# Patient Record
Sex: MALE | Race: OTHER | Employment: FULL TIME | URBAN - METROPOLITAN AREA
[De-identification: names, ages, dates, MRNs, and addresses within clinical notes are randomized per-mention and may not be internally consistent; named-entity substitution may affect disease eponyms.]

---

## 2024-10-14 ENCOUNTER — HOSPITAL ENCOUNTER (EMERGENCY)
Facility: HOSPITAL | Age: 49
Discharge: HOME | End: 2024-10-14
Attending: EMERGENCY MEDICINE
Payer: COMMERCIAL

## 2024-10-14 VITALS
DIASTOLIC BLOOD PRESSURE: 81 MMHG | RESPIRATION RATE: 16 BRPM | OXYGEN SATURATION: 97 % | TEMPERATURE: 97.9 F | SYSTOLIC BLOOD PRESSURE: 125 MMHG | HEART RATE: 80 BPM

## 2024-10-14 DIAGNOSIS — M54.9 BACK PAIN: Primary | ICD-10-CM

## 2024-10-14 DIAGNOSIS — M54.10 RADICULOPATHY: ICD-10-CM

## 2024-10-14 PROCEDURE — 99284 EMERGENCY DEPT VISIT MOD MDM: CPT | Performed by: EMERGENCY MEDICINE

## 2024-10-14 PROCEDURE — 96372 THER/PROPH/DIAG INJ SC/IM: CPT

## 2024-10-14 PROCEDURE — 99283 EMERGENCY DEPT VISIT LOW MDM: CPT

## 2024-10-14 RX ORDER — DEXAMETHASONE SODIUM PHOSPHATE 10 MG/ML
10 INJECTION, SOLUTION INTRAMUSCULAR; INTRAVENOUS ONCE
Status: COMPLETED | OUTPATIENT
Start: 2024-10-14 | End: 2024-10-14

## 2024-10-14 RX ORDER — METHOCARBAMOL 500 MG/1
500 TABLET, FILM COATED ORAL 2 TIMES DAILY
Qty: 20 TABLET | Refills: 0 | Status: SHIPPED | OUTPATIENT
Start: 2024-10-14

## 2024-10-14 RX ORDER — METHOCARBAMOL 500 MG/1
500 TABLET, FILM COATED ORAL ONCE
Status: COMPLETED | OUTPATIENT
Start: 2024-10-14 | End: 2024-10-14

## 2024-10-14 RX ORDER — METHYLPREDNISOLONE 4 MG/1
TABLET ORAL
Qty: 21 TABLET | Refills: 0 | Status: SHIPPED | OUTPATIENT
Start: 2024-10-14

## 2024-10-14 RX ORDER — KETOROLAC TROMETHAMINE 30 MG/ML
30 INJECTION, SOLUTION INTRAMUSCULAR; INTRAVENOUS ONCE
Status: COMPLETED | OUTPATIENT
Start: 2024-10-14 | End: 2024-10-14

## 2024-10-14 RX ORDER — NAPROXEN 500 MG/1
500 TABLET ORAL 2 TIMES DAILY WITH MEALS
Qty: 30 TABLET | Refills: 0 | Status: SHIPPED | OUTPATIENT
Start: 2024-10-14

## 2024-10-14 RX ADMIN — DEXAMETHASONE SODIUM PHOSPHATE 10 MG: 10 INJECTION INTRAMUSCULAR; INTRAVENOUS at 10:33

## 2024-10-14 RX ADMIN — METHOCARBAMOL 500 MG: 500 TABLET ORAL at 10:29

## 2024-10-14 RX ADMIN — KETOROLAC TROMETHAMINE 30 MG: 30 INJECTION, SOLUTION INTRAMUSCULAR; INTRAVENOUS at 10:30

## 2024-10-14 NOTE — ED PROVIDER NOTES
Pt Name: Jacob Larson  MRN: [id number]  Birthdate 1975  Age/Sex: 49 y.o. male  Date of evaluation: 10/14/2024  PCP: No primary care provider on file.        FINAL IMPRESSION    Final diagnoses:   Back pain   Radiculopathy         DISPOSITION/PLAN  Time reflects when diagnosis was documented in both MDM as applicable and the Disposition within this note       Time User Action Codes Description Comment    10/14/2024 10:23 AM Stephania Nolan Add [M54.9] Back pain     10/14/2024 10:23 AM Stephania Nolan Add [M54.10] Radiculopathy           ED Disposition       ED Disposition   Discharge    Condition   Stable    Date/Time   Mon Oct 14, 2024 10:23 AM    Comment   Jacob Larson discharge to home/self care.                   Follow-up Information       Follow up With Specialties Details Why Contact Info Additional Information    [facility] Physical Therapy Schedule an appointment as soon as possible for a visit   [phone]              PATIENT REFERRED TO:    [facility]  [phone]  Schedule an appointment as soon as possible for a visit         DISCHARGE MEDICATIONS:    Discharge Medication List as of 10/14/2024 10:27 AM        START taking these medications    Details   methocarbamol (ROBAXIN) 500 mg tablet Take 1 tablet (500 mg total) by mouth 2 (two) times a day, Starting Mon 10/14/2024, Normal      methylPREDNISolone 4 MG tablet therapy pack Use as directed on package, Normal      naproxen (Naprosyn) 500 mg tablet Take 1 tablet (500 mg total) by mouth 2 (two) times a day with meals, Starting Mon 10/14/2024, Normal                       CHIEF COMPLAINT    Chief Complaint   Patient presents with    Back Pain     Sciatica pain and down the right leg for three days         HPI    Jacob presents to the Emergency Department complaining of 3 days of back pain radiating down right LE.  He has worsening pain with walking and  movement.  He has had this similar pain in the past and was treated in the ED but has not had imaging nor seen his PCP or a specialist.  There is no new trauma.  He has not had bowel or bladder incontinence or retention.         History provided by:  Patient   used: Yes          Past Medical and Surgical History    Past Medical History:   Diagnosis Date    Sciatica        History reviewed. No pertinent surgical history.    History reviewed. No pertinent family history.    Social History     Tobacco Use    Smoking status: Never    Smokeless tobacco: Never   Substance Use Topics    Alcohol use: Never    Drug use: Never         .    Allergies    No Known Allergies    Home Medications    Prior to Admission medications    Not on File           Review of Systems    Review of Systems   Constitutional:  Negative for chills and fever.   HENT:  Negative for ear pain and sore throat.    Eyes:  Negative for pain and visual disturbance.   Respiratory:  Negative for cough and shortness of breath.    Cardiovascular:  Negative for chest pain and palpitations.   Gastrointestinal:  Negative for abdominal pain and vomiting.   Genitourinary:  Negative for dysuria and hematuria.   Musculoskeletal:  Positive for back pain and gait problem. Negative for arthralgias.   Skin:  Negative for color change and rash.   Neurological:  Negative for seizures and syncope.   All other systems reviewed and are negative.      Physical Exam      ED Triage Vitals   Temperature Pulse Respirations Blood Pressure SpO2   10/14/24 1013 10/14/24 1008 10/14/24 1007 10/14/24 1007 10/14/24 1008   97.9 °F (36.6 °C) 73 16 128/73 99 %      Temp Source Heart Rate Source Patient Position - Orthostatic VS BP Location FiO2 (%)   10/14/24 1013 10/14/24 1008 10/14/24 1007 10/14/24 1007 --   Oral Monitor Lying Right arm       Pain Score       10/14/24 1007       10 - Worst Possible Pain               Physical Exam  Vitals and nursing note reviewed.    Constitutional:       General: He is not in acute distress.     Appearance: He is well-developed. He is not diaphoretic.   HENT:      Head: Normocephalic and atraumatic.      Nose: Nose normal.   Eyes:      General: No scleral icterus.     Extraocular Movements: EOM normal.      Conjunctiva/sclera: Conjunctivae normal.      Pupils: Pupils are equal, round, and reactive to light.   Cardiovascular:      Rate and Rhythm: Normal rate and regular rhythm.      Heart sounds: Normal heart sounds. No murmur heard.     No friction rub. No gallop.   Pulmonary:      Effort: Pulmonary effort is normal. No respiratory distress.      Breath sounds: Normal breath sounds. No wheezing or rales.   Abdominal:      General: Bowel sounds are normal. There is no distension.      Palpations: Abdomen is soft.      Tenderness: There is no abdominal tenderness. There is no guarding or rebound.   Musculoskeletal:      Cervical back: Normal range of motion and neck supple.      Lumbar back: No swelling, deformity or bony tenderness. Normal range of motion. Positive right straight leg raise test.        Back:    Skin:     General: Skin is warm and dry.   Neurological:      Mental Status: He is alert and oriented to person, place, and time.   Psychiatric:         Mood and Affect: Mood and affect normal.         Behavior: Behavior normal.                Assessment and Plan    Jacob Larson is a 49 y.o. male who presents with back pain. Physical examination remarkable for no midline bony tenderness, pain with ROM and no neurologic deficits. Differential diagnosis (not completely inclusive) includes lumbar radiculopathy/ sciatic nerve pain. Plan will be to treat symptomatically.      MDM  Number of Diagnoses or Management Options  Back pain  Radiculopathy  Diagnosis management comments: Patient with acute non-traumatic lumbar back pain without neurological deficit. No indications for emergency imaging at this time. Will treat conservatively and  have patient follow up with primary care provided for re-evaluation. Patient has been instructed to return to the Emergency Department with any acute changes in condition.    Ambulatory referral placed for comprehensive spine.  Pain control and follow up.         Diagnostic Results        Labs:    No results found for this or any previous visit.    All labs reviewed and utilized in the medical decision making process    Radiology:    No orders to display       All radiology studies independently viewed by me and interpreted by the radiologist.    Procedure    Procedures      ED Course of Care and Re-Assessments      Medications   ketorolac (TORADOL) injection 30 mg (30 mg Intramuscular Given 10/14/24 1030)   methocarbamol (ROBAXIN) tablet 500 mg (500 mg Oral Given 10/14/24 1029)   dexamethasone (PF) (DECADRON) injection 10 mg (10 mg Intramuscular Given 10/14/24 1033)                  Stephania Nolan, DO Stephania Nolan,   10/14/24 122

## 2024-10-14 NOTE — Clinical Note
Jacob Larson was seen and treated in our emergency department on 10/14/2024.                Diagnosis:     Jacob  may return to work on return date.    He may return on this date: 10/16/2024         If you have any questions or concerns, please don't hesitate to call.      Stephania Nolan, DO    ______________________________           _______________          _______________  Hospital Representative                              Date                                Time

## 2024-10-15 ENCOUNTER — TELEPHONE (OUTPATIENT)
Dept: PHYSICAL THERAPY | Facility: OTHER | Age: 49
End: 2024-10-15

## 2024-10-15 NOTE — TELEPHONE ENCOUNTER
Call placed to the patient per [facility] referral.    Per I.S#[id number] Pedro Luis, no MB picked up to LM. He said he let it ring many times. Will try again on another date    This is the 1st attempt to reach the patient.  Will defer per protocol.

## 2024-10-18 NOTE — TELEPHONE ENCOUNTER
Call placed to the patient per [facility] referral.     Unable to leave a message. Phone kept ringing.    This is the 2nd attempt to reach the patient.  Will defer per protocol.

## 2024-10-22 ENCOUNTER — APPOINTMENT (OUTPATIENT)
Dept: RADIOLOGY | Facility: HOSPITAL | Age: 49
End: 2024-10-22
Payer: COMMERCIAL

## 2024-10-22 ENCOUNTER — HOSPITAL ENCOUNTER (EMERGENCY)
Facility: HOSPITAL | Age: 49
Discharge: HOME | End: 2024-10-22
Attending: EMERGENCY MEDICINE
Payer: COMMERCIAL

## 2024-10-22 VITALS
OXYGEN SATURATION: 97 % | HEART RATE: 84 BPM | RESPIRATION RATE: 18 BRPM | DIASTOLIC BLOOD PRESSURE: 70 MMHG | TEMPERATURE: 97.5 F | SYSTOLIC BLOOD PRESSURE: 121 MMHG

## 2024-10-22 DIAGNOSIS — R42 VERTIGO: Primary | ICD-10-CM

## 2024-10-22 LAB
ALBUMIN SERPL BCG-MCNC: 4.3 G/DL (ref 3.5–5)
ALP SERPL-CCNC: 75 U/L (ref 34–104)
ALT SERPL W P-5'-P-CCNC: 26 U/L (ref 7–52)
AMORPH URATE CRY URNS QL MICRO: NORMAL
ANION GAP SERPL CALCULATED.3IONS-SCNC: 14 MMOL/L (ref 4–13)
APTT PPP: 25 SECONDS (ref 23–34)
AST SERPL W P-5'-P-CCNC: 15 U/L (ref 13–39)
ATRIAL RATE: 80 BPM
BACTERIA UR QL AUTO: NORMAL /HPF
BASOPHILS # BLD AUTO: 0.06 THOUSANDS/ΜL (ref 0–0.1)
BASOPHILS NFR BLD AUTO: 1 % (ref 0–1)
BILIRUB SERPL-MCNC: 1.1 MG/DL (ref 0.2–1)
BILIRUB UR QL STRIP: NEGATIVE
BNP SERPL-MCNC: 12 PG/ML (ref 0–100)
BUN SERPL-MCNC: 16 MG/DL (ref 5–25)
CALCIUM SERPL-MCNC: 9.3 MG/DL (ref 8.4–10.2)
CARDIAC TROPONIN I PNL SERPL HS: <2 NG/L
CARDIAC TROPONIN I PNL SERPL HS: <2 NG/L
CHLORIDE SERPL-SCNC: 102 MMOL/L (ref 96–108)
CLARITY UR: ABNORMAL
CO2 SERPL-SCNC: 22 MMOL/L (ref 21–32)
COLOR UR: YELLOW
CREAT SERPL-MCNC: 0.8 MG/DL (ref 0.6–1.3)
D DIMER PPP FEU-MCNC: <0.27 UG/ML FEU
EOSINOPHIL # BLD AUTO: 0.03 THOUSAND/ΜL (ref 0–0.61)
EOSINOPHIL NFR BLD AUTO: 0 % (ref 0–6)
ERYTHROCYTE [DISTWIDTH] IN BLOOD BY AUTOMATED COUNT: 13.9 % (ref 11.6–15.1)
FLUAV AG UPPER RESP QL IA.RAPID: NEGATIVE
FLUBV AG UPPER RESP QL IA.RAPID: NEGATIVE
GFR SERPL CREATININE-BSD FRML MDRD: 105 ML/MIN/1.73SQ M
GLUCOSE SERPL-MCNC: 132 MG/DL (ref 65–140)
GLUCOSE SERPL-MCNC: 138 MG/DL (ref 65–140)
GLUCOSE UR STRIP-MCNC: NEGATIVE MG/DL
HCT VFR BLD AUTO: 47.1 % (ref 36.5–49.3)
HGB BLD-MCNC: 15.2 G/DL (ref 12–17)
HGB UR QL STRIP.AUTO: NEGATIVE
IMM GRANULOCYTES # BLD AUTO: 0.11 THOUSAND/UL (ref 0–0.2)
IMM GRANULOCYTES NFR BLD AUTO: 1 % (ref 0–2)
INR PPP: 0.94 (ref 0.85–1.19)
KETONES UR STRIP-MCNC: NEGATIVE MG/DL
LEUKOCYTE ESTERASE UR QL STRIP: NEGATIVE
LYMPHOCYTES # BLD AUTO: 3.94 THOUSANDS/ΜL (ref 0.6–4.47)
LYMPHOCYTES NFR BLD AUTO: 34 % (ref 14–44)
MCH RBC QN AUTO: 28.5 PG (ref 26.8–34.3)
MCHC RBC AUTO-ENTMCNC: 32.3 G/DL (ref 31.4–37.4)
MCV RBC AUTO: 88 FL (ref 82–98)
MONOCYTES # BLD AUTO: 0.54 THOUSAND/ΜL (ref 0.17–1.22)
MONOCYTES NFR BLD AUTO: 5 % (ref 4–12)
MUCOUS THREADS UR QL AUTO: NORMAL
NEUTROPHILS # BLD AUTO: 7.07 THOUSANDS/ΜL (ref 1.85–7.62)
NEUTS SEG NFR BLD AUTO: 59 % (ref 43–75)
NITRITE UR QL STRIP: NEGATIVE
NON-SQ EPI CELLS URNS QL MICRO: NORMAL /HPF
NRBC BLD AUTO-RTO: 0 /100 WBCS
P AXIS: 50 DEGREES
PH UR STRIP.AUTO: 8 [PH]
PLATELET # BLD AUTO: 292 THOUSANDS/UL (ref 149–390)
PMV BLD AUTO: 8.9 FL (ref 8.9–12.7)
POTASSIUM SERPL-SCNC: 3.8 MMOL/L (ref 3.5–5.3)
PR INTERVAL: 150 MS
PROT SERPL-MCNC: 7.5 G/DL (ref 6.4–8.4)
PROT UR STRIP-MCNC: ABNORMAL MG/DL
PROTHROMBIN TIME: 13.1 SECONDS (ref 12.3–15)
QRS AXIS: 85 DEGREES
QRSD INTERVAL: 86 MS
QT INTERVAL: 358 MS
QTC INTERVAL: 412 MS
RBC # BLD AUTO: 5.34 MILLION/UL (ref 3.88–5.62)
RBC #/AREA URNS AUTO: NORMAL /HPF
SARS-COV+SARS-COV-2 AG RESP QL IA.RAPID: NEGATIVE
SODIUM SERPL-SCNC: 138 MMOL/L (ref 135–147)
SP GR UR STRIP.AUTO: 1.02 (ref 1–1.03)
T WAVE AXIS: 20 DEGREES
UROBILINOGEN UR STRIP-ACNC: <2 MG/DL
VENTRICULAR RATE: 80 BPM
WBC # BLD AUTO: 11.75 THOUSAND/UL (ref 4.31–10.16)
WBC #/AREA URNS AUTO: NORMAL /HPF

## 2024-10-22 PROCEDURE — 71046 X-RAY EXAM CHEST 2 VIEWS: CPT

## 2024-10-22 PROCEDURE — 93010 ELECTROCARDIOGRAM REPORT: CPT | Performed by: INTERNAL MEDICINE

## 2024-10-22 PROCEDURE — 93005 ELECTROCARDIOGRAM TRACING: CPT

## 2024-10-22 PROCEDURE — 99285 EMERGENCY DEPT VISIT HI MDM: CPT | Performed by: EMERGENCY MEDICINE

## 2024-10-22 PROCEDURE — 85610 PROTHROMBIN TIME: CPT | Performed by: EMERGENCY MEDICINE

## 2024-10-22 PROCEDURE — 85025 COMPLETE CBC W/AUTO DIFF WBC: CPT | Performed by: EMERGENCY MEDICINE

## 2024-10-22 PROCEDURE — 96361 HYDRATE IV INFUSION ADD-ON: CPT

## 2024-10-22 PROCEDURE — 84484 ASSAY OF TROPONIN QUANT: CPT | Performed by: EMERGENCY MEDICINE

## 2024-10-22 PROCEDURE — 99284 EMERGENCY DEPT VISIT MOD MDM: CPT

## 2024-10-22 PROCEDURE — 87804 INFLUENZA ASSAY W/OPTIC: CPT | Performed by: EMERGENCY MEDICINE

## 2024-10-22 PROCEDURE — 96374 THER/PROPH/DIAG INJ IV PUSH: CPT

## 2024-10-22 PROCEDURE — 83880 ASSAY OF NATRIURETIC PEPTIDE: CPT | Performed by: EMERGENCY MEDICINE

## 2024-10-22 PROCEDURE — 36415 COLL VENOUS BLD VENIPUNCTURE: CPT

## 2024-10-22 PROCEDURE — 82948 REAGENT STRIP/BLOOD GLUCOSE: CPT

## 2024-10-22 PROCEDURE — 81001 URINALYSIS AUTO W/SCOPE: CPT | Performed by: EMERGENCY MEDICINE

## 2024-10-22 PROCEDURE — 80053 COMPREHEN METABOLIC PANEL: CPT | Performed by: EMERGENCY MEDICINE

## 2024-10-22 PROCEDURE — 87811 SARS-COV-2 COVID19 W/OPTIC: CPT | Performed by: EMERGENCY MEDICINE

## 2024-10-22 PROCEDURE — 85730 THROMBOPLASTIN TIME PARTIAL: CPT | Performed by: EMERGENCY MEDICINE

## 2024-10-22 PROCEDURE — 85379 FIBRIN DEGRADATION QUANT: CPT | Performed by: EMERGENCY MEDICINE

## 2024-10-22 RX ORDER — MECLIZINE HYDROCHLORIDE 25 MG/1
25 TABLET ORAL 3 TIMES DAILY PRN
Qty: 20 TABLET | Refills: 0 | Status: SHIPPED | OUTPATIENT
Start: 2024-10-22

## 2024-10-22 RX ORDER — ONDANSETRON 2 MG/ML
4 INJECTION INTRAMUSCULAR; INTRAVENOUS ONCE
Status: COMPLETED | OUTPATIENT
Start: 2024-10-22 | End: 2024-10-22

## 2024-10-22 RX ORDER — ONDANSETRON 4 MG/1
4 TABLET, FILM COATED ORAL EVERY 8 HOURS PRN
Qty: 15 TABLET | Refills: 0 | Status: SHIPPED | OUTPATIENT
Start: 2024-10-22

## 2024-10-22 RX ADMIN — ONDANSETRON 4 MG: 2 INJECTION, SOLUTION INTRAMUSCULAR; INTRAVENOUS at 17:18

## 2024-10-22 RX ADMIN — SODIUM CHLORIDE 1000 ML: 0.9 INJECTION, SOLUTION INTRAVENOUS at 16:06

## 2024-10-22 NOTE — Clinical Note
Jacob Larson was seen and treated in our emergency department on 10/22/2024.                Diagnosis:     Jacob  may return to work on return date.    He may return on this date: 10/28/2024         If you have any questions or concerns, please don't hesitate to call.      Sean Thompson, DO    ______________________________           _______________          _______________  Hospital Representative                              Date                                Time

## 2024-10-22 NOTE — ED PROVIDER NOTES
Time reflects when diagnosis was documented in both MDM as applicable and the Disposition within this note       Time User Action Codes Description Comment    10/22/2024  6:23 PM Sean Thompson Add [R42] Vertigo           ED Disposition       ED Disposition   Discharge    Condition   Stable    Date/Time   Tue Oct 22, 2024  6:23 PM    Comment   Jacob Neo discharge to home/self care.                   Assessment & Plan       Medical Decision Making  Dizziness differential includes cardiac rhythm disturbance atypical cardiac disease anxiety toxic metabolic etiology workup in progress EKG chest x-ray and lab work    Amount and/or Complexity of Data Reviewed  Labs: ordered.  Radiology: ordered and independent interpretation performed.    Risk  Prescription drug management.        ED Course as of 10/23/24 1116   Tue Oct 22, 2024   1810 Delta troponin is negative repeat EKG is no acute injury       Medications   sodium chloride 0.9 % bolus 1,000 mL (0 mL Intravenous Stopped 10/22/24 1706)   ondansetron (ZOFRAN) injection 4 mg (4 mg Intravenous Given 10/22/24 1718)       ED Risk Strat Scores   HEART Risk Score      Flowsheet Row Most Recent Value   Heart Score Risk Calculator    History 0 Filed at: 10/22/2024 1708   ECG 0 Filed at: 10/22/2024 1708   Age 1 Filed at: 10/22/2024 1708   Risk Factors 0 Filed at: 10/22/2024 1708   Troponin 0 Filed at: 10/22/2024 1708   HEART Score 1 Filed at: 10/22/2024 1708                               SBIRT 20yo+      Flowsheet Row Most Recent Value   Initial Alcohol Screen: US AUDIT-C     1. How often do you have a drink containing alcohol? 0 Filed at: 10/22/2024 1512   2. How many drinks containing alcohol do you have on a typical day you are drinking?  0 Filed at: 10/22/2024 1512   3a. Male UNDER 65: How often do you have five or more drinks on one occasion? 0 Filed at: 10/22/2024 1512   3b. FEMALE Any Age, or MALE 65+: How often do you have 4 or more drinks on one occassion? 0 Filed at:  10/22/2024 1512   Audit-C Score 0 Filed at: 10/22/2024 1512   ANDRADE: How many times in the past year have you...    Used an illegal drug or used a prescription medication for non-medical reasons? Never Filed at: 10/22/2024 1512                            History of Present Illness       Chief Complaint   Patient presents with    Dizziness     Patient presents to the ED with c/o nausea and dizziness that began this morning, states he feels like he is going to pass out. States he believes his blood pressure is low       Past Medical History:   Diagnosis Date    Sciatica       History reviewed. No pertinent surgical history.   History reviewed. No pertinent family history.   Social History     Tobacco Use    Smoking status: Never    Smokeless tobacco: Never   Substance Use Topics    Alcohol use: Never    Drug use: Never      E-Cigarette/Vaping    E-Cigarette Use Never Assessed       E-Cigarette/Vaping Substances    Nicotine No     THC No     CBD No     Flavoring No     Other No     Unknown No       I have reviewed and agree with the history as documented.     This is a 49-year-old male who presents for evaluation of lightheadedness nausea chest tightness anxiety and tingling in his feet that started this morning      History provided by:  Patient   used: Yes    Medical Problem  Location:  Generalized  Quality:  Lightheadedness and dizziness  Severity:  Moderate  Onset quality:  Gradual  Duration:  8 hours  Timing:  Constant  Progression:  Waxing and waning  Chronicity:  New  Context:  Lightheadedness dizziness and chest tightness that started this morning  Associated symptoms: chest pain, fatigue and nausea        Review of Systems   Constitutional:  Positive for fatigue.   Cardiovascular:  Positive for chest pain.   Gastrointestinal:  Positive for nausea.   Neurological:  Positive for dizziness and light-headedness.   All other systems reviewed and are negative.          Objective       ED Triage  Vitals   Temperature Pulse Blood Pressure Respirations SpO2 Patient Position - Orthostatic VS   10/22/24 1511 10/22/24 1509 10/22/24 1509 10/22/24 1509 10/22/24 1509 10/22/24 1509   97.5 °F (36.4 °C) 88 133/84 18 99 % Sitting      Temp Source Heart Rate Source BP Location FiO2 (%) Pain Score    10/22/24 1511 10/22/24 1509 10/22/24 1509 -- 10/22/24 1509    Temporal Monitor Right arm  6      Vitals      Date and Time Temp Pulse SpO2 Resp BP Pain Score FACES Pain Rating User   10/22/24 1748 -- 84 97 % 18 121/70 -- -- SS   10/22/24 1544 -- 95 97 % 17 119/68 -- --    10/22/24 1511 97.5 °F (36.4 °C) -- -- -- -- -- --    10/22/24 1509 -- 88 99 % 18 133/84 6 --             Physical Exam  Vitals and nursing note reviewed.   Constitutional:       General: He is not in acute distress.     Appearance: He is not diaphoretic.   HENT:      Head: Normocephalic and atraumatic.      Right Ear: Tympanic membrane, ear canal and external ear normal.      Left Ear: Tympanic membrane, ear canal and external ear normal.   Eyes:      General:         Right eye: No discharge.         Left eye: No discharge.      Extraocular Movements: Extraocular movements intact.      Pupils: Pupils are equal, round, and reactive to light.   Cardiovascular:      Rate and Rhythm: Normal rate and regular rhythm.      Pulses: Normal pulses.      Heart sounds: No murmur heard.     No friction rub. No gallop.   Pulmonary:      Effort: Pulmonary effort is normal. No respiratory distress.      Breath sounds: No stridor. No wheezing, rhonchi or rales.   Abdominal:      General: There is no distension.      Palpations: Abdomen is soft.      Tenderness: There is no abdominal tenderness. There is no guarding or rebound.   Musculoskeletal:         General: No swelling, tenderness, deformity or signs of injury.      Cervical back: Normal range of motion and neck supple. No rigidity or tenderness.      Right lower leg: No edema.      Left lower leg: No edema.    Skin:     General: Skin is warm and dry.      Coloration: Skin is not jaundiced.      Findings: No bruising, erythema or rash.   Neurological:      General: No focal deficit present.      Mental Status: He is alert and oriented to person, place, and time.      Cranial Nerves: No cranial nerve deficit.      Sensory: No sensory deficit.      Coordination: Coordination normal.   Psychiatric:         Mood and Affect: Mood normal.         Behavior: Behavior normal.         Results Reviewed       Procedure Component Value Units Date/Time    Urine Microscopic [[id number]] Collected: 10/22/24 1822    Lab Status: Final result Specimen: Urine, Clean Catch Updated: 10/22/24 1952     RBC, UA None Seen /hpf      WBC, UA 1-2 /hpf      Epithelial Cells None Seen /hpf      Bacteria, UA None Seen /hpf      MUCUS THREADS None Seen     Amorphous Crystals, UA Occasional    UA w Reflex to Microscopic w Reflex to Culture [[id number]]  (Abnormal) Collected: 10/22/24 1822    Lab Status: Final result Specimen: Urine, Clean Catch Updated: 10/22/24 1847     Color, UA Yellow     Clarity, UA Slightly Cloudy     Specific Gravity, UA 1.020     pH, UA 8.0     Leukocytes, UA Negative     Nitrite, UA Negative     Protein, UA Trace mg/dl      Glucose, UA Negative mg/dl      Ketones, UA Negative mg/dl      Urobilinogen, UA <2.0 mg/dl      Bilirubin, UA Negative     Occult Blood, UA Negative    B-Type Natriuretic Peptide(BNP) [[id number]]  (Normal) Collected: 10/22/24 1514    Lab Status: Final result Specimen: Blood from Arm, Right Updated: 10/22/24 1805     BNP 12 pg/mL     FLU/COVID Rapid Antigen (30 min. TAT) - Preferred screening test in ED [[id number]]  (Normal) Collected: 10/22/24 1727    Lab Status: Final result Specimen: Nares from Nose Updated: 10/22/24 1755     SARS COV Rapid Antigen Negative     Influenza A Rapid Antigen Negative     Influenza B Rapid Antigen Negative    Narrative:      This test has been performed using the [employer] [facility] 2  FLU+SARS Antigen test under the Emergency Use Authorization (EUA). This test has been validated by the  and verified by the performing laboratory. The [employer] uses lateral flow immunofluorescent sandwich assay to detect SARS-COV, Influenza A and Influenza B Antigen.     The [employer] 2 SARS Antigen test does not differentiate between SARS-CoV and SARS-CoV-2.     Negative results are presumptive and may be confirmed with a molecular assay, if necessary, for patient management. Negative results do not rule out SARS-CoV-2 or influenza infection and should not be used as the sole basis for treatment or patient management decisions. A negative test result may occur if the level of antigen in a sample is below the limit of detection of this test.     Positive results are indicative of the presence of viral antigens, but do not rule out bacterial infection or co-infection with other viruses.     All test results should be used as an adjunct to clinical observations and other information available to the provider.    FOR PEDIATRIC PATIENTS - copy/paste COVID Guidelines URL to browser: [email or web address]    HS Troponin I 2hr [[id number]] Collected: 10/22/24 1727    Lab Status: Final result Specimen: Blood from Arm, Left Updated: 10/22/24 1754     hs TnI 2hr <2 ng/L      Delta 2hr hsTnI --    D-Dimer [[id number]]  (Normal) Collected: 10/22/24 1514    Lab Status: Final result Specimen: Blood from Arm, Right Updated: 10/22/24 1748     D-Dimer, Quant <0.27 ug/ml FEU     Protime-INR [[id number]]  (Normal) Collected: 10/22/24 1514    Lab Status: Final result Specimen: Blood from Arm, Right Updated: 10/22/24 1745     Protime 13.1 seconds      INR 0.94    Narrative:      INR Therapeutic Range    Indication                                             INR Range      Atrial Fibrillation                                               2.0-3.0  Hypercoagulable State                                     2.0.2.3  Left Ventricular Asist Device                            2.0-3.0  Mechanical Heart Valve                                  -    Aortic(with afib, MI, embolism, HF, LA enlargement,    and/or coagulopathy)                                     2.0-3.0 (2.5-3.5)     Mitral                                                             2.5-3.5  Prosthetic/Bioprosthetic Heart Valve               2.0-3.0  Venous thromboembolism (VTE: VT, PE        2.0-3.0    APTT [[id number]]  (Normal) Collected: 10/22/24 1514    Lab Status: Final result Specimen: Blood from Arm, Right Updated: 10/22/24 1745     PTT 25 seconds     [place] draw [[id number]] Collected: 10/22/24 1514    Lab Status: Final result Specimen: Blood from Arm, Right Updated: 10/22/24 1701    Narrative:      The following orders were created for panel order [place] draw.  Procedure                               Abnormality         Status                     ---------                               -----------         ------                     Light Blue Top on hold[[id number]]                           Final result               Gold top on hold[[id number]]                                 Final result                 Please view results for these tests on the individual orders.    HS Troponin 0hr (reflex protocol) [[id number]]  (Normal) Collected: 10/22/24 1514    Lab Status: Final result Specimen: Blood from Arm, Right Updated: 10/22/24 1547     hs TnI 0hr <2 ng/L     Comprehensive metabolic panel [[id number]]  (Abnormal) Collected: 10/22/24 1514    Lab Status: Final result Specimen: Blood from Arm, Right Updated: 10/22/24 1539     Sodium 138 mmol/L      Potassium 3.8 mmol/L      Chloride 102 mmol/L      CO2 22 mmol/L      ANION GAP 14 mmol/L      BUN 16 mg/dL      Creatinine 0.80 mg/dL      Glucose 132 mg/dL      Calcium 9.3 mg/dL      AST 15 U/L      ALT 26 U/L      Alkaline Phosphatase 75 U/L      Total Protein 7.5 g/dL      Albumin 4.3  g/dL      Total Bilirubin 1.10 mg/dL      eGFR 105 ml/min/1.73sq m     Narrative:      National Kid[employer] Foundation guidelines for Chronic Kidney Disease (CKD):     Stage 1 with normal or high GFR (GFR > 90 mL/min/1.73 square meters)    Stage 2 Mild CKD (GFR = 60-89 mL/min/1.73 square meters)    Stage 3A Moderate CKD (GFR = 45-59 mL/min/1.73 square meters)    Stage 3B Moderate CKD (GFR = 30-44 mL/min/1.73 square meters)    Stage 4 Severe CKD (GFR = 15-29 mL/min/1.73 square meters)    Stage 5 End Stage CKD (GFR <15 mL/min/1.73 square meters)  Note: GFR calculation is accurate only with a steady state creatinine    Fingerstick Glucose (POCT) [[id number]]  (Normal) Collected: 10/22/24 1531    Lab Status: Final result Specimen: Blood Updated: 10/22/24 1533     POC Glucose 138 mg/dl     CBC and differential [[id number]]  (Abnormal) Collected: 10/22/24 1514    Lab Status: Final result Specimen: Blood from Arm, Right Updated: 10/22/24 1522     WBC 11.75 Thousand/uL      RBC 5.34 Million/uL      Hemoglobin 15.2 g/dL      Hematocrit 47.1 %      MCV 88 fL      MCH 28.5 pg      MCHC 32.3 g/dL      RDW 13.9 %      MPV 8.9 fL      Platelets 292 Thousands/uL      nRBC 0 /100 WBCs      Segmented % 59 %      Immature Grans % 1 %      Lymphocytes % 34 %      Monocytes % 5 %      Eosinophils Relative 0 %      Basophils Relative 1 %      Absolute Neutrophils 7.07 Thousands/µL      Absolute Immature Grans 0.11 Thousand/uL      Absolute Lymphocytes 3.94 Thousands/µL      Absolute Monocytes 0.54 Thousand/µL      Eosinophils Absolute 0.03 Thousand/µL      Basophils Absolute 0.06 Thousands/µL             XR chest 2 views   ED Interpretation by Sean Thompson DO (10/22 1624)   No acute infiltrate or congestive heart failure      Final Interpretation by Gavino Connell MD (10/22 2056)      No acute cardiopulmonary disease.            Workstation performed: [id number]             ECG 12 Lead Documentation Only    Date/Time:  10/22/2024 4:28 PM    Performed by: Sean Thompson DO  Authorized by: Sean Thompson DO    ECG reviewed by me, the ED Provider: yes    Patient location:  ED  Rate:     ECG rate:  80  Rhythm:     Rhythm: sinus rhythm    Conduction:     Conduction: normal    T waves:     T waves: normal    ECG 12 Lead Documentation Only    Date/Time: 10/22/2024 5:23 PM    Performed by: Sean Thompson DO  Authorized by: Sean Thompson DO    ECG reviewed by me, the ED Provider: yes    Patient location:  ED  Rate:     ECG rate:  67  Rhythm:     Rhythm: sinus rhythm    Conduction:     Conduction: normal    T waves:     T waves: normal        ED Medication and Procedure Management   Prior to Admission Medications   Prescriptions Last Dose Informant Patient Reported? Taking?   methocarbamol (ROBAXIN) 500 mg tablet   No No   Sig: Take 1 tablet (500 mg total) by mouth 2 (two) times a day   methylPREDNISolone 4 MG tablet therapy pack   No No   Sig: Use as directed on package   naproxen (Naprosyn) 500 mg tablet   No No   Sig: Take 1 tablet (500 mg total) by mouth 2 (two) times a day with meals      Facility-Administered Medications: None     Discharge Medication List as of 10/22/2024  6:27 PM        START taking these medications    Details   meclizine (ANTIVERT) 25 mg tablet Take 1 tablet (25 mg total) by mouth 3 (three) times a day as needed for dizziness, Starting Tue 10/22/2024, Normal      ondansetron (ZOFRAN) 4 mg tablet Take 1 tablet (4 mg total) by mouth every 8 (eight) hours as needed for nausea or vomiting, Starting Tue 10/22/2024, Normal           CONTINUE these medications which have NOT CHANGED    Details   methocarbamol (ROBAXIN) 500 mg tablet Take 1 tablet (500 mg total) by mouth 2 (two) times a day, Starting Mon 10/14/2024, Normal      methylPREDNISolone 4 MG tablet therapy pack Use as directed on package, Normal      naproxen (Naprosyn) 500 mg tablet Take 1 tablet (500 mg total) by mouth 2 (two) times a day with  meals, Starting Mon 10/14/2024, Normal           No discharge procedures on file.  ED SEPSIS DOCUMENTATION   Time reflects when diagnosis was documented in both MDM as applicable and the Disposition within this note       Time User Action Codes Description Comment    10/22/2024  6:23 PM Sean Thompson Add [R42] Vertigo                  Sean Thompson DO  10/23/24 1114

## 2024-10-23 LAB
ATRIAL RATE: 67 BPM
P AXIS: 52 DEGREES
PR INTERVAL: 160 MS
QRS AXIS: 82 DEGREES
QRSD INTERVAL: 100 MS
QT INTERVAL: 382 MS
QTC INTERVAL: 403 MS
T WAVE AXIS: 20 DEGREES
VENTRICULAR RATE: 67 BPM

## 2024-10-23 PROCEDURE — 93010 ELECTROCARDIOGRAM REPORT: CPT | Performed by: INTERNAL MEDICINE

## 2024-10-25 NOTE — TELEPHONE ENCOUNTER
Call placed to the patient per [facility] referral.     Unable to leave a message. Phone kept ringing.     This is the 3rd attempt to reach the patient.  Will close referral per protocol.